# Patient Record
Sex: FEMALE | Race: WHITE | Employment: FULL TIME | URBAN - METROPOLITAN AREA
[De-identification: names, ages, dates, MRNs, and addresses within clinical notes are randomized per-mention and may not be internally consistent; named-entity substitution may affect disease eponyms.]

---

## 2017-05-14 ENCOUNTER — HOSPITAL ENCOUNTER (INPATIENT)
Facility: HOSPITAL | Age: 22
LOS: 1 days | Discharge: HOME | DRG: 918 | End: 2017-05-16
Attending: EMERGENCY MEDICINE | Admitting: FAMILY MEDICINE
Payer: COMMERCIAL

## 2017-05-14 DIAGNOSIS — L51.1 STEVENS-JOHNSON SYNDROME (HCC): Primary | ICD-10-CM

## 2017-05-14 PROCEDURE — 93010 ELECTROCARDIOGRAM REPORT: CPT | Performed by: EMERGENCY MEDICINE

## 2017-05-14 PROCEDURE — 93005 ELECTROCARDIOGRAM TRACING: CPT

## 2017-05-14 PROCEDURE — 96375 TX/PRO/DX INJ NEW DRUG ADDON: CPT

## 2017-05-14 PROCEDURE — 99285 EMERGENCY DEPT VISIT HI MDM: CPT

## 2017-05-14 PROCEDURE — 96361 HYDRATE IV INFUSION ADD-ON: CPT

## 2017-05-14 PROCEDURE — 87430 STREP A AG IA: CPT

## 2017-05-14 PROCEDURE — 96374 THER/PROPH/DIAG INJ IV PUSH: CPT

## 2017-05-14 RX ORDER — DIPHENHYDRAMINE HYDROCHLORIDE 50 MG/ML
INJECTION INTRAMUSCULAR; INTRAVENOUS
Status: COMPLETED
Start: 2017-05-14 | End: 2017-05-14

## 2017-05-14 RX ORDER — ACETAMINOPHEN 500 MG
1000 TABLET ORAL ONCE
Status: COMPLETED | OUTPATIENT
Start: 2017-05-14 | End: 2017-05-14

## 2017-05-14 RX ORDER — METHYLPREDNISOLONE SODIUM SUCCINATE 125 MG/2ML
60 INJECTION, POWDER, LYOPHILIZED, FOR SOLUTION INTRAMUSCULAR; INTRAVENOUS ONCE
Status: COMPLETED | OUTPATIENT
Start: 2017-05-14 | End: 2017-05-14

## 2017-05-15 ENCOUNTER — APPOINTMENT (OUTPATIENT)
Dept: GENERAL RADIOLOGY | Facility: HOSPITAL | Age: 22
DRG: 918 | End: 2017-05-15
Attending: NURSE PRACTITIONER
Payer: COMMERCIAL

## 2017-05-15 PROBLEM — R21 RASH: Status: ACTIVE | Noted: 2017-05-15

## 2017-05-15 PROBLEM — B34.9 VIRAL SYNDROME: Status: ACTIVE | Noted: 2017-05-15

## 2017-05-15 PROBLEM — L51.1 STEVENS-JOHNSON SYNDROME (HCC): Status: ACTIVE | Noted: 2017-05-15

## 2017-05-15 PROCEDURE — 85025 COMPLETE CBC W/AUTO DIFF WBC: CPT | Performed by: HOSPITALIST

## 2017-05-15 PROCEDURE — 87581 M.PNEUMON DNA AMP PROBE: CPT | Performed by: NURSE PRACTITIONER

## 2017-05-15 PROCEDURE — 87040 BLOOD CULTURE FOR BACTERIA: CPT | Performed by: EMERGENCY MEDICINE

## 2017-05-15 PROCEDURE — 85025 COMPLETE CBC W/AUTO DIFF WBC: CPT | Performed by: EMERGENCY MEDICINE

## 2017-05-15 PROCEDURE — 80048 BASIC METABOLIC PNL TOTAL CA: CPT | Performed by: HOSPITALIST

## 2017-05-15 PROCEDURE — 71020 XR CHEST PA + LAT CHEST (CPT=71020): CPT | Performed by: NURSE PRACTITIONER

## 2017-05-15 PROCEDURE — 87633 RESP VIRUS 12-25 TARGETS: CPT | Performed by: NURSE PRACTITIONER

## 2017-05-15 PROCEDURE — 87798 DETECT AGENT NOS DNA AMP: CPT | Performed by: NURSE PRACTITIONER

## 2017-05-15 PROCEDURE — 81001 URINALYSIS AUTO W/SCOPE: CPT | Performed by: HOSPITALIST

## 2017-05-15 PROCEDURE — 87486 CHLMYD PNEUM DNA AMP PROBE: CPT | Performed by: NURSE PRACTITIONER

## 2017-05-15 PROCEDURE — 80048 BASIC METABOLIC PNL TOTAL CA: CPT | Performed by: EMERGENCY MEDICINE

## 2017-05-15 PROCEDURE — 84132 ASSAY OF SERUM POTASSIUM: CPT | Performed by: HOSPITALIST

## 2017-05-15 RX ORDER — LORAZEPAM 2 MG/ML
0.5 INJECTION INTRAMUSCULAR ONCE
Status: COMPLETED | OUTPATIENT
Start: 2017-05-15 | End: 2017-05-15

## 2017-05-15 RX ORDER — DIPHENHYDRAMINE HYDROCHLORIDE 50 MG/ML
12.5 INJECTION INTRAMUSCULAR; INTRAVENOUS EVERY 4 HOURS PRN
Status: DISCONTINUED | OUTPATIENT
Start: 2017-05-15 | End: 2017-05-15

## 2017-05-15 RX ORDER — POTASSIUM CHLORIDE 20 MEQ/1
40 TABLET, EXTENDED RELEASE ORAL EVERY 4 HOURS
Status: COMPLETED | OUTPATIENT
Start: 2017-05-15 | End: 2017-05-15

## 2017-05-15 RX ORDER — DIPHENHYDRAMINE HCL 25 MG
25 CAPSULE ORAL EVERY 4 HOURS PRN
Status: DISCONTINUED | OUTPATIENT
Start: 2017-05-15 | End: 2017-05-16

## 2017-05-15 RX ORDER — PREDNISONE 20 MG/1
40 TABLET ORAL
Status: DISCONTINUED | OUTPATIENT
Start: 2017-05-16 | End: 2017-05-15

## 2017-05-15 RX ORDER — ACETAMINOPHEN 325 MG/1
650 TABLET ORAL EVERY 6 HOURS PRN
Status: DISCONTINUED | OUTPATIENT
Start: 2017-05-15 | End: 2017-05-16

## 2017-05-15 RX ORDER — METHYLPREDNISOLONE SODIUM SUCCINATE 125 MG/2ML
60 INJECTION, POWDER, LYOPHILIZED, FOR SOLUTION INTRAMUSCULAR; INTRAVENOUS EVERY 6 HOURS
Status: DISCONTINUED | OUTPATIENT
Start: 2017-05-15 | End: 2017-05-15

## 2017-05-15 RX ORDER — ENOXAPARIN SODIUM 100 MG/ML
40 INJECTION SUBCUTANEOUS DAILY
Status: DISCONTINUED | OUTPATIENT
Start: 2017-05-16 | End: 2017-05-15

## 2017-05-15 RX ORDER — PREDNISONE 20 MG/1
40 TABLET ORAL
Status: DISCONTINUED | OUTPATIENT
Start: 2017-05-15 | End: 2017-05-16

## 2017-05-15 RX ORDER — ONDANSETRON 2 MG/ML
4 INJECTION INTRAMUSCULAR; INTRAVENOUS EVERY 6 HOURS PRN
Status: DISCONTINUED | OUTPATIENT
Start: 2017-05-15 | End: 2017-05-16

## 2017-05-15 RX ORDER — SODIUM CHLORIDE 9 MG/ML
INJECTION, SOLUTION INTRAVENOUS CONTINUOUS
Status: DISCONTINUED | OUTPATIENT
Start: 2017-05-15 | End: 2017-05-16

## 2017-05-15 NOTE — ED NOTES
Patient to ED for c/o possible allergic reaction this evening. Patient states she took bactrim around 7pm and then went for out to walk her dog.    Patient states she felt her heart racing shortly after and with nausea and an itchy rash that has become wors

## 2017-05-15 NOTE — H&P
[facility] Hospitalist Team  History and Physical     ASSESSMENT / PLAN:   26 yo female with hx acne, anxiety, migraines, exercise induced asthma who presents with fever, rash, N/V, weakness.  Concerns for drug rx with bactrim, now improved with steroids and benad long time as she has been suffering from weakness and tiredness for over 3 yrs- she has seen holistic MD and was on thyroid pills but she stopped them in October, her recent TSH was done by PCP and was \"ok\".  After the walk she developed fever, HA, LE wea family history. Review of Systems  A comprehensive 10 point review of systems was completed. Pertinent positives and negatives noted in the the HPI.       DIAGNOSTIC DATA:   CBC/Chem  Recent Labs   Lab  05/15/17   0015  05/15/17   0531   WBC  4.2  3.6* pending  -UA negative  -RVP, CXR  -IV solumedrol stopped changed to po prednisone, ok with ID, prn benadryl and tylenol  -ID consulted    ST  -tele- HR up to 140, but now improved   -IVF  -will follow     Elevated BS  -likely due to steroids  -check A1C

## 2017-05-15 NOTE — ED INITIAL ASSESSMENT (HPI)
Pt reports taking a new acne medication last Thursday. Pt reports, \"Fevers, and emesis\". Pt states, \"I also had pineapple today, so I don't know if I had an allergic rxn to that either\". Pt's lung sounds clear. No swelling noted to tongue or throat.  Pt

## 2017-05-15 NOTE — ED NOTES
Did not have enough blood to fill both vials of the second set of cultures. Filled the aerobic culture (blue top) and sent it down to lab.

## 2017-05-15 NOTE — ED PROVIDER NOTES
Patient Seen in: [facility] AND [facility] Emergency Department    History   Patient presents with:   Allergic Rxn Allergies (immune)    Stated Complaint: allergic reaction been going on for several days, hives and vomiting, no sob    HPI    Patient complains of 2307 Temporal   SpO2 05/14/17 2307 100 %   O2 Device 05/14/17 2307 None (Room air)       Current:BP 95/49 mmHg  Pulse 71  Temp(Src) 98.5 °F (36.9 °C) (Oral)  Resp 16  Ht 162.6 cm (5' 4\")  Wt 62.596 kg  BMI 23.68 kg/m2  SpO2 98%  LMP 04/18/2017  Breastfeed All other components within normal limits   CBC W/ DIFFERENTIAL - Abnormal; Notable for the following:     RBC 3.61 (*)     HGB 11.5 (*)     HCT 33.4 (*)     Lymphocyte Absolute 0.8 (*)     All other components within normal limits   POTASSIUM - Normal   E noted on EKG Report.   Rate: 138  Rhythm: s tach  Reading: s tach, nl pr, nl qrs                MDM       Cardiac Monitor: Pulse Readings from Last 1 Encounters:  05/16/17 : 71  , s tac,      Radiology findings:         Patient given IVF, antipyretics still

## 2017-05-15 NOTE — CONSULTS
[facility] AND [facility] Infectious Disease  Report of Consultation    Ira Alert Patient Status:  Inpatient    1/15/1995 MRN [id number]   Location [facility] 5SW/SE Attending Janet Weaver MD   Hosp Day # 1 PCP Andra Memory     Date of Admiss Anaphylaxis    Medications:    Current facility-administered medications:   •  0.9%  NaCl infusion, , Intravenous, Continuous  •  acetaminophen (TYLENOL) tab 650 mg, 650 mg, Oral, Q6H PRN  •  ondansetron HCl (ZOFRAN) injection 4 mg, 4 mg, Intravenous, Q6H Normocephalic, without obvious abnormality, atraumatic. Eyes: Conjunctivae/corneas clear. No scleral icterus. No conjunctival     hemorrhage. Nose: Nares normal.   Throat:  Oropharynx clear, MMs moist.   Neck: Trachea ML, no masses.    Lungs: CTA b/l PM

## 2017-05-16 VITALS
RESPIRATION RATE: 16 BRPM | HEART RATE: 71 BPM | OXYGEN SATURATION: 98 % | WEIGHT: 138 LBS | SYSTOLIC BLOOD PRESSURE: 95 MMHG | BODY MASS INDEX: 23.56 KG/M2 | TEMPERATURE: 99 F | DIASTOLIC BLOOD PRESSURE: 49 MMHG | HEIGHT: 64 IN

## 2017-05-16 PROCEDURE — 85025 COMPLETE CBC W/AUTO DIFF WBC: CPT | Performed by: NURSE PRACTITIONER

## 2017-05-16 PROCEDURE — 80048 BASIC METABOLIC PNL TOTAL CA: CPT | Performed by: NURSE PRACTITIONER

## 2017-05-16 RX ORDER — TRETINOIN 0.025 %
CREAM (GRAM) TOPICAL NIGHTLY
Status: ON HOLD | COMMUNITY
End: 2017-05-16

## 2017-05-16 RX ORDER — DIPHENHYDRAMINE HCL 25 MG
25 CAPSULE ORAL EVERY 8 HOURS PRN
Qty: 20 CAPSULE | Refills: 0 | Status: SHIPPED | OUTPATIENT
Start: 2017-05-16 | End: 2017-05-22 | Stop reason: ALTCHOICE

## 2017-05-16 RX ORDER — PREDNISONE 20 MG/1
TABLET ORAL
Qty: 17 TABLET | Refills: 0 | Status: SHIPPED | OUTPATIENT
Start: 2017-05-16 | End: 2018-04-09 | Stop reason: ALTCHOICE

## 2017-05-16 RX ORDER — POTASSIUM CHLORIDE 20 MEQ/1
40 TABLET, EXTENDED RELEASE ORAL ONCE
Status: COMPLETED | OUTPATIENT
Start: 2017-05-16 | End: 2017-05-16

## 2017-05-16 NOTE — PLAN OF CARE
Problem: Patient/Family Goals  Goal: Patient/Family Long Term Goal  Patient’s Long Term Goal: to return home  Interventions:  -take all prescribed medications  -Follow MD recommendation  - See additional Care Plan goals for specific interventions   Outcome as appropriate  - Consider OT/PT consult to assist with strengthening/mobility  - Encourage toileting schedule   Outcome: Progressing  Patient call light and bedside table within reach.   Patient verbalizes understanding of call light and voices that she wi

## 2017-05-16 NOTE — PLAN OF CARE
Problem: Patient/Family Goals  Goal: Patient/Family Long Term Goal  Patient’s Long Term Goal: to return home  Interventions:  -take all prescribed medications  -Follow MD recommendation  - See additional Care Plan goals for specific interventions   Outcome assistance with activity based on assessment  - Modify environment to reduce risk of injury  - Provide assistive devices as appropriate  - Consider OT/PT consult to assist with strengthening/mobility  - Encourage toileting schedule   Outcome: Adequate for patient/family  - Incorporate patient and family knowledge, values, beliefs, and cultural backgrounds into the planning and delivery of care  - Encourage patient/family to participate in care and decision-making at the level they choose  - Honor patient an

## 2017-05-16 NOTE — DISCHARGE SUMMARY
[facility] Hospitalist Discharge Summary   Patient ID:  Sonia Conley  [id number]  2 year old  1/15/1995    Admit date: 5/14/2017  Discharge date: 5/16/2017  Risk of Readmission Lace+ Score: 17  59-90 High Risk  29-58 Medium Risk  0-28   Low Risk    Primary Care for PCP given as pt can no longer follow with pediatrician due to age.  Pt d/C to home,  see below for details    Drug Rx 2/2 Bactrim  -improved rash and fever off bactrim and in setting of IV steroids and benadryl  -t max 101.6, no longer with fevers, WBC any pharmacy     Bring a paper prescription for each of these medications    - DiphenhydrAMINE HCl 25 MG Caps  - predniSONE 20 MG Tabs        Important follow up: Follow-up Information     Follow up with Susy Morales MD In 1 week.     Specialty:

## 2017-05-16 NOTE — PROGRESS NOTES
[facility] Infectious Disease  Progress Note    Julia Castillo Patient Status:  Inpatient    1/15/1995 MRN [id number]   Location [facility] 5SW/SE Attending Naima Yusuf MD   Hosp Day # 2 PCP [facility]     Subjective:  Patient seen treatment  - Defer to dermatology    4.  Disposition - stable for d/c from ID on steroid taper. Patient knows she should never try sulfa agents again. F/u with ID prn.       [facility]  [phone]    5/16/2017

## 2017-05-16 NOTE — PLAN OF CARE
Problem: Patient/Family Goals  Goal: Patient/Family Long Term Goal  Patient’s Long Term Goal: to return home  Interventions:    - See additional Care Plan goals for specific interventions   Outcome: Progressing  Patient states she feels better today.  Jana devices as appropriate  - Consider OT/PT consult to assist with strengthening/mobility  - Encourage toileting schedule   Outcome: Progressing  Patient up ad shayna - discusses safety    Problem: DISCHARGE PLANNING  Goal: Discharge to home or other facility wi

## 2020-10-19 ENCOUNTER — HOSPITAL (OUTPATIENT)
Dept: OTHER | Age: 25
End: 2020-10-19
Attending: FAMILY MEDICINE

## 2020-10-19 LAB
CONTROL LINE: PRESENT
Lab: CLEAR
UA APPEAR POC: CLEAR
UA APPEAR POC: CLEAR
UA BILI POC: NEGATIVE
UA BILI POC: NEGATIVE
UA BLOOD POC: NEGATIVE
UA BLOOD POC: NEGATIVE
UA COLOR POC: YELLOW
UA COLOR POC: YELLOW
UA GLUCOSE POC: NEGATIVE
UA GLUCOSE POC: NEGATIVE
UA KETONES POC: NEGATIVE
UA KETONES POC: NEGATIVE
UA LEUK EST POC: NEGATIVE
UA LEUK EST POC: NEGATIVE
UA NITRITE POC: NEGATIVE
UA NITRITE POC: NEGATIVE
UA PH POC: 6 (ref 5–7)
UA PH POC: 6 (ref 5–7)
UA PROTEIN POC: NEGATIVE
UA PROTEIN POC: NEGATIVE
UA SPEC GRAV POC: 1.01 (ref 1.01–1.02)
UA SPEC GRAV POC: 1.01 (ref 1.01–1.02)
UA UROBILIN POC: 0.2 MG/DL
UA UROBILIN POC: 0.2 MG/DL
URINE PREG POC: NEGATIVE
URINE PREG POC: NEGATIVE

## (undated) NOTE — IP AVS SNAPSHOT
[address]  [address] [phone]                Discharge Summary   5/14/2017    [facility]           Admission Information        Provider Department    5/14/2017 Violet Montgomery MD [facility] 5sw/Se Where to Get Your Medications      Please  your prescriptions at the location directed by your doctor or nurse     Bring a paper prescription for each of these medications    - DiphenhydrAMINE HCl 25 MG Caps  - predniSONE 20 MG Tori Blandon 79 (05/16/17)  13 (05/16/17)  8 (05/16/17)  0 (05/16/17)  0 -- (05/16/17)  4.7 (05/16/17)  0.8 (L) (05/16/17)  0.5 (05/16/17)  0.0 (05/16/17)  0.0    (05/15/17)  94 (05/15/17)  2 (05/15/17)  3 (05/15/17)  0 (05/15/17)  0  (05/15/17)  3.4 (05/15/17)  0.1 (L and ask to get set up for an insurance coverage that is in-network with [facility]. [employer]     Sign up for MaxCDN, your secure online medical record.   [employer]N will allow you to access patient instructions from your recent visit,  view blood pressure, fluid retention, mood changes, stomach upset/pain, headache, dizziness           Anti-Histamines     DiphenhydrAMINE HCl 25 MG Oral Cap         Use:  Treat Allergies   Most common side effects:  Drowsiness, dry mouth   What to report to you

## (undated) NOTE — LETTER
May 16, 2017       Chandrakant Pedraza  [facility]       To Whom It May Concern:     Ever Fernandes has been under our care regarding ongoing medical issues from 5/14/17 - 5/16/17 Because of this, she has been required to restrict h